# Patient Record
Sex: MALE | Employment: OTHER | URBAN - METROPOLITAN AREA
[De-identification: names, ages, dates, MRNs, and addresses within clinical notes are randomized per-mention and may not be internally consistent; named-entity substitution may affect disease eponyms.]

---

## 2017-03-05 ENCOUNTER — APPOINTMENT (OUTPATIENT)
Dept: GENERAL RADIOLOGY | Age: 27
End: 2017-03-05
Attending: EMERGENCY MEDICINE
Payer: OTHER GOVERNMENT

## 2017-03-05 ENCOUNTER — HOSPITAL ENCOUNTER (EMERGENCY)
Age: 27
Discharge: HOME | End: 2017-03-05
Attending: EMERGENCY MEDICINE
Payer: OTHER GOVERNMENT

## 2017-03-05 VITALS
TEMPERATURE: 97.5 F | OXYGEN SATURATION: 100 % | RESPIRATION RATE: 16 BRPM | BODY MASS INDEX: 23.7 KG/M2 | DIASTOLIC BLOOD PRESSURE: 84 MMHG | SYSTOLIC BLOOD PRESSURE: 122 MMHG | HEIGHT: 69 IN | HEART RATE: 70 BPM | WEIGHT: 160 LBS

## 2017-03-05 DIAGNOSIS — R07.89 ATYPICAL CHEST PAIN: Primary | ICD-10-CM

## 2017-03-05 PROCEDURE — 71020 XR CHEST PA LAT: CPT

## 2017-03-05 PROCEDURE — 74011250636 HC RX REV CODE- 250/636: Performed by: EMERGENCY MEDICINE

## 2017-03-05 PROCEDURE — 93005 ELECTROCARDIOGRAM TRACING: CPT

## 2017-03-05 PROCEDURE — 99283 EMERGENCY DEPT VISIT LOW MDM: CPT

## 2017-03-05 PROCEDURE — 96372 THER/PROPH/DIAG INJ SC/IM: CPT

## 2017-03-05 RX ORDER — OMEPRAZOLE 20 MG/1
20 CAPSULE, DELAYED RELEASE ORAL DAILY
Qty: 15 CAP | Refills: 0 | Status: SHIPPED | OUTPATIENT
Start: 2017-03-05

## 2017-03-05 RX ORDER — KETOROLAC TROMETHAMINE 10 MG/1
10 TABLET, FILM COATED ORAL EVERY 8 HOURS
Qty: 15 TAB | Refills: 0 | Status: SHIPPED | OUTPATIENT
Start: 2017-03-05 | End: 2017-03-10

## 2017-03-05 RX ORDER — KETOROLAC TROMETHAMINE 30 MG/ML
60 INJECTION, SOLUTION INTRAMUSCULAR; INTRAVENOUS
Status: COMPLETED | OUTPATIENT
Start: 2017-03-05 | End: 2017-03-05

## 2017-03-05 RX ADMIN — KETOROLAC TROMETHAMINE 60 MG: 30 INJECTION, SOLUTION INTRAMUSCULAR at 19:45

## 2017-03-06 NOTE — ED NOTES
Pt discharged home stable and ambulatory. Pain level at discharge 0. Pt discharged with friend. Reviewed discharged instructions with patient who verbalized understanding.   Patient armband removed and shredded

## 2017-03-06 NOTE — ED PROVIDER NOTES
[address]  EMERGENCY DEPARTMENT HISTORY AND PHYSICAL EXAM       Date: 3/5/2017   Patient Name: Ashly Gomez   YOB: 1990  Medical Record Number: [id number]    History of Presenting Illness     Chief Complaint   Patient presents with    Chest Pain        History Provided By:  patient    Additional History:   7:30 PM   Ashly Gomez is a 23 y.o. male with hx pericarditis who presents to the emergency department C/O gradually worsening, intermittent, sharp, 6/10 chest pain that began while pt was laying down this AM. Episodes lasts for seconds at a time and have no mediating or worsening factors. Associated symptoms include sweaty hands, SOB, and nausea. Reports he has had similar sxs in the past 1 year ago and was dx with pericarditis, was rx an anti-inflammatory and Tramadol for a few months, while stationed in [facility]. Pt has tried taking Ibuprofen for pain with some relief; last dose was 4 hours ago. 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 Pt denies fever, chills, vomiting, neck pain, abdominal pain, back pain, FHx of heart dz, recent surgeries, and any other Sx or complaints. Primary Care Provider: None   Specialist:    Past History     Past Medical History:   Past Medical History:   Diagnosis Date    Pericarditis, acute         Past Surgical History:   No past surgical history on file. Family History:   No family history on file. Social History:   Social History   Substance Use Topics    Smoking status: Never Smoker    Smokeless tobacco: Not on file    Alcohol use Yes        Allergies:   No Known Allergies     Review of Systems   Review of Systems   Constitutional: Positive for diaphoresis (hands). Negative for chills and fever. Respiratory: Positive for shortness of breath. Cardiovascular: Positive for chest pain. Gastrointestinal: Positive for nausea. Negative for abdominal pain and vomiting. Musculoskeletal: Negative for back pain and neck pain. All other systems reviewed and are negative. Physical Exam  Vitals:    03/05/17 1913   BP: 139/88   Pulse: 71   Resp: 14   Temp: 97.5 °F (36.4 °C)   SpO2: 100%   Weight: 72.6 kg (160 lb)   Height: 5' 9\" (1.753 m)       Physical Exam   Constitutional: He is oriented to person, place, and time. He appears well-developed and well-nourished. Appears in NAD. HENT:   Head: Normocephalic and atraumatic. Eyes: Pupils are equal, round, and reactive to light. Neck: Neck supple. Cardiovascular: Normal rate, regular rhythm, S1 normal, S2 normal and normal heart sounds. Pulmonary/Chest: Breath sounds normal. No respiratory distress. He has no wheezes. He has no rales. He exhibits no tenderness. Abdominal: Soft. He exhibits no distension and no mass. There is no tenderness. There is no guarding. Musculoskeletal: Normal range of motion. He exhibits no edema or tenderness. Neurological: He is alert and oriented to person, place, and time. No cranial nerve deficit. Skin: No rash noted. Psychiatric: He has a normal mood and affect. His behavior is normal. Thought content normal.   Nursing note and vitals reviewed. Diagnostic Study Results     Labs -      Recent Results (from the past 12 hour(s))   EKG, 12 LEAD, INITIAL    Collection Time: 03/05/17  7:21 PM   Result Value Ref Range    Ventricular Rate 64 BPM    Atrial Rate 64 BPM    P-R Interval 142 ms    QRS Duration 102 ms    Q-T Interval 400 ms    QTC Calculation (Bezet) 412 ms    Calculated P Axis 56 degrees    Calculated R Axis 67 degrees    Calculated T Axis 59 degrees    Diagnosis       Normal sinus rhythm  Normal ECG  No previous ECGs available         Radiologic Studies -  XR CHEST PA LAT   Final Result   IMPRESSION: Normal chest.  As read by the radiologist.         Medical Decision Making   I am the first provider for this patient.      I reviewed the vital signs, available nursing notes, past medical history, past surgical history, family history and social history. INITIAL CLINICAL IMPRESSION and PLANS:  The patient presents with the primary complaint(s) of: chest pain. The presentation, to include historical aspects and clinical findings appear to be consistent with the DX of atypical chest pain. However, other possible DX's to consider as primary, associated with, or exacerbated by include:    1.  MI  2. ACS  3.  PE  4. Aortic Dissection   5. Pericarditis  6. Chest Wall Pain  7. GI: GERD, gallbladder dz, pancreatitis, hiatal hernia, esophageal spasm  8. Psych: anxiety, panic attack    Considering the above, my initial management plan to evaluate and therapeutic interventions include: Obtain Lab Studies, and Obtain Radiologic studies,  As well as those noted in the orders:    Ketan Youngblood     Vital Signs-Reviewed the patient's vital signs. Patient Vitals for the past 12 hrs:   Temp Pulse Resp BP SpO2   03/05/17 1913 97.5 °F (36.4 °C) 71 14 139/88 100 %       Pulse Oximetry Analysis - Normal 100% on room air     Cardiac Monitor:   Rate: 64 bpm  Rhythm: Normal Sinus Rhythm, no interventions     EKG interpretation: (Preliminary)  NSR. Rate 64 bpm. Normal QRS. EKG read by Khurram Herrera MD at 19:21     Old Medical Records: Nursing notes. ED Course:    7:30 PM   Initial assessment performed. Medications Given in the ED:  Medications   ketorolac tromethamine (TORADOL) 60 mg/2 mL injection 60 mg (60 mg IntraMUSCular Given 3/5/17 1945)       Discharge Note:  9:04 PM  Pt has been reexamined. Patient has no new complaints, changes, or physical findings. Care plan outlined and precautions discussed. Results were reviewed with the patient. All medications were reviewed with the patient; will d/c home with Prilosec and Toradol. All of pt's questions and concerns were addressed.  Patient was instructed and agrees to follow up with PCP, as well as to return to the ED upon further deterioration. Patient is ready to go home. Diagnosis   Clinical Impression:   1. Atypical chest pain           Follow-up Information     Follow up With Details Comments [facility], [place] Call in 2 days For follow up with your PCP or doctor listed. [address]      [facility] Go to As needed, If symptoms worsen [address] Gwendolyn Frye  [phone]          Current Discharge Medication List      START taking these medications    Details   omeprazole (PRILOSEC) 20 mg capsule Take 1 Cap by mouth daily. Qty: 15 Cap, Refills: 0         CONTINUE these medications which have CHANGED    Details   ketorolac (TORADOL) 10 mg tablet Take 1 Tab by mouth every eight (8) hours for 5 days. Qty: 15 Tab, Refills: 0         CONTINUE these medications which have NOT CHANGED    Details   amoxicillin 500 mg tab Take 500 mg by mouth three (3) times daily. Qty: 21 Tab, Refills: 0      guaiFENesin SR (MUCINEX) 600 mg SR tablet Take 1 Tab by mouth two (2) times a day. Qty: 10 Tab, Refills: 0             _______________________________   Attestations: This note is prepared by Lucy Florez, acting as a Scribe for Vibha Villanueva MD on 7:27 PM on 3/5/2017 . Vibha Villanueva MD: The scribe's documentation has been prepared under my direction and personally reviewed by me in its entirety.   _______________________________

## 2017-03-06 NOTE — ED TRIAGE NOTES
C/o chest pain since this morning, shortness of breath. Sepsis Screening completed    (  )Patient meets SIRS criteria. ( x )Patient does not meet SIRS criteria.       SIRS Criteria is achieved when two or more of the following are present   Temperature < 96.8°F (36°C) or > 100.9°F (38.3°C)   Heart Rate > 90 beats per minute   Respiratory Rate > 20 breaths per minute   WBC count > 12,000 or <4,000 or > 10% bands

## 2017-03-06 NOTE — DISCHARGE INSTRUCTIONS
Chest Pain: Care Instructions  Your Care Instructions  There are many things that can cause chest pain. Some are not serious and will get better on their own in a few days. But some kinds of chest pain need more testing and treatment. Your doctor may have recommended a follow-up visit in the next 8 to 12 hours. If you are not getting better, you may need more tests or treatment. Even though your doctor has released you, you still need to watch for any problems. The doctor carefully checked you, but sometimes problems can develop later. If you have new symptoms or if your symptoms do not get better, get medical care right away. If you have worse or different chest pain or pressure that lasts more than 5 minutes or you passed out (lost consciousness), call 911 or seek other emergency help right away. A medical visit is only one step in your treatment. Even if you feel better, you still need to do what your doctor recommends, such as going to all suggested follow-up appointments and taking medicines exactly as directed. This will help you recover and help prevent future problems. How can you care for yourself at home? · Rest until you feel better. · Take your medicine exactly as prescribed. Call your doctor if you think you are having a problem with your medicine. · Do not drive after taking a prescription pain medicine. When should you call for help? Call 911 if:  · You passed out (lost consciousness). · You have severe difficulty breathing. · You have symptoms of a heart attack. These may include:  ¨ Chest pain or pressure, or a strange feeling in your chest.  ¨ Sweating. ¨ Shortness of breath. ¨ Nausea or vomiting. ¨ Pain, pressure, or a strange feeling in your back, neck, jaw, or upper belly or in one or both shoulders or arms. ¨ Lightheadedness or sudden weakness. ¨ A fast or irregular heartbeat.   After you call 911, the  may tell you to chew 1 adult-strength or 2 to 4 low-dose aspirin. Wait for an ambulance. Do not try to drive yourself. Call your doctor today if:  · You have any trouble breathing. · Your chest pain gets worse. · You are dizzy or lightheaded, or you feel like you may faint. · You are not getting better as expected. · You are having new or different chest pain. Where can you learn more? Go to http://mikel-dustin.info/. Enter A120 in the search box to learn more about \"Chest Pain: Care Instructions. \"  Current as of: May 27, 2016  Content Version: 11.1  © 8831-0413 [employer]. Care instructions adapted under license by [employer] (which disclaims liability or warranty for this information). If you have questions about a medical condition or this instruction, always ask your healthcare professional. [address] any warranty or liability for your use of this information.

## 2017-03-12 LAB
ATRIAL RATE: 64 BPM
CALCULATED P AXIS, ECG09: 56 DEGREES
CALCULATED R AXIS, ECG10: 67 DEGREES
CALCULATED T AXIS, ECG11: 59 DEGREES
DIAGNOSIS, 93000: NORMAL
P-R INTERVAL, ECG05: 142 MS
Q-T INTERVAL, ECG07: 400 MS
QRS DURATION, ECG06: 102 MS
QTC CALCULATION (BEZET), ECG08: 412 MS
VENTRICULAR RATE, ECG03: 64 BPM

## 2018-12-17 NOTE — ED NOTES
Pt reports that pain is resolved. To xray with radiology tech. Use Separate Skin Prep For Stages And Repair: Yes